# Patient Record
(demographics unavailable — no encounter records)

---
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
## 2024-12-20 NOTE — PLAN
[FreeTextEntry1] :  MEKHI  is a 58 year old presenting for well woman exam.   HCM - Discussed resources for depression.  - HPV/ PAP done today. - Referral for mammogram, breast sono and bone density given. - Referral for TVS given.  - RTO for 1 year annual/ PRN.

## 2024-12-20 NOTE — PHYSICAL EXAM
[Chaperone Present] : A chaperone was present in the examining room during all aspects of the physical examination [Appropriately responsive] : appropriately responsive [Alert] : alert [No Acute Distress] : no acute distress [No Lymphadenopathy] : no lymphadenopathy [Regular Rate Rhythm] : regular rate rhythm [No Murmurs] : no murmurs [Clear to Auscultation B/L] : clear to auscultation bilaterally [Soft] : soft [Non-tender] : non-tender [Non-distended] : non-distended [No HSM] : No HSM [No Lesions] : no lesions [No Mass] : no mass [Oriented x3] : oriented x3 [Examination Of The Breasts] : a normal appearance [No Discharge] : no discharge [No Masses] : no breast masses were palpable [Labia Majora] : normal [Labia Minora] : normal [Normal] : normal [Uterine Adnexae] : normal [FreeTextEntry2] :  OLAF Boyle. [FreeTextEntry6] : Dense breasts

## 2024-12-20 NOTE — END OF VISIT
[FreeTextEntry3] : This note was written by Shannen Hayes on 12/20/2024 actively solely Lj Ontiveros M.D.  All medical record entries made by this scribe at my, Lj Ontiveros M.D direction and personally dictated by me on 12/20/2024. I have personally reviewed the chart and agree that the record reflects my personal performance of the history, physical exam, assessment, and plan.

## 2025-01-03 NOTE — PLAN
[FreeTextEntry1] : Pt advised her fibroids has been stable the past couple of years and pt will watch and wait. Will evaluate with ultrasound to check fibroid stability in a year. All of pt's questions were answered to her apparent satisfaction,  RTO 1 year for annual

## 2025-01-03 NOTE — SIGNATURES
[TextEntry] : This note was written by Lucille Arvizu on 01/03/2025 actively solely ABDELRAHMAN Mayfield M.D 01/03/2025. All medical record entries made by this scribe were at my  ABDELRAHMAN Mayfield M.D  direction and personally dictated by me on 01/03/2025. I have personally reviewed my chart and agree that the record reflects my personal performance of the history, physical exam, assessment, and plan.

## 2025-01-03 NOTE — HISTORY OF PRESENT ILLNESS
[FreeTextEntry1] : 58 year old menopausal female present for follow-up GYN visit to pelvic pain. H/o fibroids. Pt is feeling well and offers no complaints. Today's US shows uterus measuring 7.67 cm and three fibroids, two measuirng about1 cm and one measuring about 2.5 cm, endometrial lining measuring 3.9 cm and right and left ovary WNL. Pt advised about sonogram.

## 2025-01-03 NOTE — COUNSELING
[Vitamins/Supplements] : vitamins/supplements [FreeTextEntry2] : ultrasound, mammo/sono and bone density results